# Patient Record
Sex: FEMALE | Race: WHITE | URBAN - METROPOLITAN AREA
[De-identification: names, ages, dates, MRNs, and addresses within clinical notes are randomized per-mention and may not be internally consistent; named-entity substitution may affect disease eponyms.]

---

## 2022-03-25 ENCOUNTER — OFFICE VISIT (OUTPATIENT)
Dept: URBAN - METROPOLITAN AREA CLINIC 16 | Facility: CLINIC | Age: 72
End: 2022-03-25
Payer: MEDICARE

## 2022-03-25 DIAGNOSIS — H25.813 COMBINED FORMS OF AGE-RELATED CATARACT, BILATERAL: Primary | ICD-10-CM

## 2022-03-25 PROCEDURE — 99204 OFFICE O/P NEW MOD 45 MIN: CPT | Performed by: OPHTHALMOLOGY

## 2022-03-25 ASSESSMENT — VISUAL ACUITY
OD: 20/50
OS: 20/50

## 2022-03-25 ASSESSMENT — KERATOMETRY
OD: 42.38
OS: 43.00

## 2022-03-25 ASSESSMENT — INTRAOCULAR PRESSURE
OS: 17
OD: 17

## 2022-03-25 NOTE — IMPRESSION/PLAN
Impression: Combined forms of age-related cataract, bilateral: H25.813. .  Visually significant, quality of life issue, could improve with surgery. Plan: Cataracts account for the patient's complaints. Discussed all risks, benefits, alternatives, procedures and recovery. Patient understands changing glasses will not improve vision. Patient desires to have surgery, recommend phacoemulsification with intraocular lens implant OS. lvl 2 - pt considering Vivity IOL - AIM plano OD and -0.35OS. Re-evaluate OD for cataract surgery after OS is done. Not OK for Dexcyu.

## 2022-07-21 ENCOUNTER — OFFICE VISIT (OUTPATIENT)
Dept: URBAN - NONMETROPOLITAN AREA CLINIC 13 | Facility: CLINIC | Age: 72
End: 2022-07-21
Payer: MEDICARE

## 2022-07-21 DIAGNOSIS — H25.811 COMBINED FORMS OF AGE-RELATED CATARACT, RIGHT EYE: ICD-10-CM

## 2022-07-21 DIAGNOSIS — H25.813 COMBINED FORMS OF AGE-RELATED CATARACT, BILATERAL: Primary | ICD-10-CM

## 2022-07-21 PROCEDURE — 99214 OFFICE O/P EST MOD 30 MIN: CPT | Performed by: OPHTHALMOLOGY

## 2022-07-21 ASSESSMENT — VISUAL ACUITY
OS: 20/40
OD: 20/50

## 2022-07-21 ASSESSMENT — INTRAOCULAR PRESSURE
OS: 19
OD: 19

## 2022-07-21 NOTE — IMPRESSION/PLAN
Impression: Combined forms of age-related cataract, bilateral: H25.813. .  Visually significant, quality of life issue, could improve with surgery. Plan: Cataracts account for the patient's complaints. Discussed all risks, benefits, alternatives, procedures and recovery. Patient understands changing glasses will not improve vision. Patient desires to have surgery, recommend phacoemulsification with intraocular lens implant OS. lvl 2 - pt considering Vivity IOL - AIM plano OD and -0.35 OS. OS then OD. OK for Dexcyu.

## 2022-07-25 ENCOUNTER — TESTING ONLY (OUTPATIENT)
Dept: URBAN - NONMETROPOLITAN AREA CLINIC 13 | Facility: CLINIC | Age: 72
End: 2022-07-25
Payer: MEDICARE

## 2022-07-25 DIAGNOSIS — Z01.818 ENCOUNTER FOR OTHER PREPROCEDURAL EXAMINATION: Primary | ICD-10-CM

## 2022-07-25 DIAGNOSIS — H25.813 COMBINED FORMS OF AGE-RELATED CATARACT, BILATERAL: ICD-10-CM

## 2022-07-25 PROCEDURE — 99203 OFFICE O/P NEW LOW 30 MIN: CPT | Performed by: PHYSICIAN ASSISTANT

## 2022-08-09 ENCOUNTER — SURGERY (OUTPATIENT)
Dept: URBAN - NONMETROPOLITAN AREA SURGERY 4 | Facility: SURGERY | Age: 72
End: 2022-08-09
Payer: MEDICARE

## 2022-08-09 DIAGNOSIS — H25.813 COMBINED FORMS OF AGE-RELATED CATARACT, BILATERAL: Primary | ICD-10-CM

## 2022-08-09 PROCEDURE — 66984 XCAPSL CTRC RMVL W/O ECP: CPT | Performed by: OPHTHALMOLOGY

## 2022-08-09 PROCEDURE — PR1FY PR1FY: CUSTOM | Performed by: OPHTHALMOLOGY

## 2022-08-10 ENCOUNTER — POST-OPERATIVE VISIT (OUTPATIENT)
Dept: URBAN - NONMETROPOLITAN AREA CLINIC 13 | Facility: CLINIC | Age: 72
End: 2022-08-10
Payer: MEDICARE

## 2022-08-10 DIAGNOSIS — Z48.810 ENCOUNTER FOR SURGICAL AFTERCARE FOLLOWING SURGERY ON A SENSE ORGAN: Primary | ICD-10-CM

## 2022-08-10 PROCEDURE — 99024 POSTOP FOLLOW-UP VISIT: CPT | Performed by: OPTOMETRIST

## 2022-08-10 ASSESSMENT — INTRAOCULAR PRESSURE
OS: 17
OD: 16

## 2022-08-10 NOTE — IMPRESSION/PLAN
Impression: S/P Cataract Extraction by phacoemulsification with IOL placement OS - 1 Day. Encounter for surgical aftercare following surgery on a sense organ  Z48.810. Post operative instructions reviewed - Plan: no dexy, needs to start post op gtts qid in OS also tears daily, pt ed on s/s of infection/rd/high iop need to rtc asap. monitor x 1 week.

## 2022-08-17 ENCOUNTER — POST-OPERATIVE VISIT (OUTPATIENT)
Dept: URBAN - NONMETROPOLITAN AREA CLINIC 13 | Facility: CLINIC | Age: 72
End: 2022-08-17
Payer: MEDICARE

## 2022-08-17 PROCEDURE — 99024 POSTOP FOLLOW-UP VISIT: CPT | Performed by: OPTOMETRIST

## 2022-08-17 RX ORDER — PREDNISOLONE ACETATE 10 MG/ML
1 % SUSPENSION/ DROPS OPHTHALMIC
Qty: 5 | Refills: 0 | Status: ACTIVE
Start: 2022-08-17

## 2022-08-17 ASSESSMENT — VISUAL ACUITY
OS: 20/100
OD: 20/25

## 2022-08-17 ASSESSMENT — INTRAOCULAR PRESSURE
OS: 9
OD: 10

## 2022-08-17 NOTE — IMPRESSION/PLAN
Impression:  Encounter for surgical aftercare following surgery on a sense organ  Z48.810. Plan: ? dexycu, pt did not get drops so needs to startt Pred Acetate Q2H  OS for 2 days then return for exam. pt ed on s/s of worsening issue need to rtc asap , tears daily, pt ed on s/s of infection/rd/high iop need to rtc asap. monitor x 2 days as directed, does not look infected, just inflammed and has had ? if any inflammatory treatment so will add this on.

## 2022-08-19 ENCOUNTER — POST-OPERATIVE VISIT (OUTPATIENT)
Dept: URBAN - NONMETROPOLITAN AREA CLINIC 13 | Facility: CLINIC | Age: 72
End: 2022-08-19
Payer: MEDICARE

## 2022-08-19 DIAGNOSIS — Z48.810 ENCOUNTER FOR SURGICAL AFTERCARE FOLLOWING SURGERY ON A SENSE ORGAN: Primary | ICD-10-CM

## 2022-08-19 PROCEDURE — 99024 POSTOP FOLLOW-UP VISIT: CPT | Performed by: OPTOMETRIST

## 2022-08-19 ASSESSMENT — VISUAL ACUITY
OD: 20/25
OS: 20/150

## 2022-08-19 ASSESSMENT — INTRAOCULAR PRESSURE
OS: 15
OD: 13

## 2022-08-19 NOTE — IMPRESSION/PLAN
Impression: S/P Cataract Extraction by phacoemulsification with IOL placement OS - 10 Days. Encounter for surgical aftercare following surgery on a sense organ  Z48.810. Condition is improving - Plan: Continue Pred QID OS, tears daily, pt ed on s/s of infection/rd/high iop need to rtc asap. monitor x 1 week.

## 2022-08-26 ENCOUNTER — POST-OPERATIVE VISIT (OUTPATIENT)
Dept: URBAN - NONMETROPOLITAN AREA CLINIC 13 | Facility: CLINIC | Age: 72
End: 2022-08-26
Payer: MEDICARE

## 2022-08-26 DIAGNOSIS — Z48.810 ENCOUNTER FOR SURGICAL AFTERCARE FOLLOWING SURGERY ON A SENSE ORGAN: Primary | ICD-10-CM

## 2022-08-26 PROCEDURE — 99024 POSTOP FOLLOW-UP VISIT: CPT | Performed by: OPTOMETRIST

## 2022-08-26 RX ORDER — PREDNISOLONE ACETATE 10 MG/ML
1 % SUSPENSION/ DROPS OPHTHALMIC
Qty: 5 | Refills: 1 | Status: ACTIVE
Start: 2022-08-26

## 2022-08-26 ASSESSMENT — INTRAOCULAR PRESSURE
OD: 16
OS: 16

## 2022-08-26 ASSESSMENT — VISUAL ACUITY: OS: 20/80

## 2022-08-26 NOTE — IMPRESSION/PLAN
Impression:  Encounter for surgical aftercare following surgery on a sense organ  Z48.810. Post operative instructions reviewed - Condition is improving - Plan: pred use tid and continue w/ tears daily, pt ed on s/s of infection/rd/high iop need to rtc asap. monitor x 2-3 weeks, pt ed on use of drops and things are improving, still decreased va but improving so will monitor when returns and decide if do surgery OD.

## 2022-09-16 ENCOUNTER — POST-OPERATIVE VISIT (OUTPATIENT)
Dept: URBAN - NONMETROPOLITAN AREA CLINIC 13 | Facility: CLINIC | Age: 72
End: 2022-09-16
Payer: MEDICARE

## 2022-09-16 DIAGNOSIS — Z48.810 ENCOUNTER FOR SURGICAL AFTERCARE FOLLOWING SURGERY ON A SENSE ORGAN: Primary | ICD-10-CM

## 2022-09-16 PROCEDURE — 99024 POSTOP FOLLOW-UP VISIT: CPT | Performed by: OPTOMETRIST

## 2022-09-16 RX ORDER — DICLOFENAC SODIUM 1 MG/ML
0.1 % SOLUTION/ DROPS OPHTHALMIC
Qty: 5 | Refills: 0 | Status: ACTIVE
Start: 2022-09-16

## 2022-09-16 ASSESSMENT — VISUAL ACUITY
OS: 20/80
OD: 20/25

## 2022-09-16 ASSESSMENT — INTRAOCULAR PRESSURE
OS: 12
OD: 14

## 2022-09-16 NOTE — IMPRESSION/PLAN
Impression: S/P Cataract Extraction by phacoemulsification with IOL placement OS - 38 Days. Encounter for surgical aftercare following surgery on a sense organ  Z48.810. Post operative instructions reviewed - Plan: Monitor, pt on pred now has moderate amount of dme, reason for decrease vision, continue w/ pred tid and added on diclofenac tid as well, will have pt see retina for eval and possible tx 2' to amount of cme, pt ed on s/s of worsening issue rtc asap, will postpone os surgery for now.

## 2022-09-28 ENCOUNTER — OFFICE VISIT (OUTPATIENT)
Dept: URBAN - NONMETROPOLITAN AREA CLINIC 13 | Facility: CLINIC | Age: 72
End: 2022-09-28
Payer: MEDICARE

## 2022-09-28 DIAGNOSIS — H25.813 COMBINED FORMS OF AGE-RELATED CATARACT, BILATERAL: Primary | ICD-10-CM

## 2022-09-28 DIAGNOSIS — H59.032 CYSTOID MACULAR EDEMA FOLLOWING CATARACT SURGERY, LEFT EYE: ICD-10-CM

## 2022-09-28 PROCEDURE — 92014 COMPRE OPH EXAM EST PT 1/>: CPT | Performed by: OPHTHALMOLOGY

## 2022-09-28 PROCEDURE — 92134 CPTRZ OPH DX IMG PST SGM RTA: CPT | Performed by: OPHTHALMOLOGY

## 2022-09-28 ASSESSMENT — INTRAOCULAR PRESSURE
OD: 16
OS: 28

## 2022-09-28 ASSESSMENT — VISUAL ACUITY
OD: 20/25
OS: 20/60

## 2022-09-28 NOTE — IMPRESSION/PLAN
Impression: Cystoid macular edema following cataract surgery, left eye: H59.032. Plan: The exam and oct show that the patients CME is improving OS. The patient will continue her drops and return to clinic with Dr. Sharon La in one month. The patient will return to retina PRN.

## 2022-10-31 ENCOUNTER — POST-OPERATIVE VISIT (OUTPATIENT)
Dept: URBAN - NONMETROPOLITAN AREA CLINIC 13 | Facility: CLINIC | Age: 72
End: 2022-10-31
Payer: MEDICARE

## 2022-10-31 DIAGNOSIS — Z96.1 PRESENCE OF INTRAOCULAR LENS: Primary | ICD-10-CM

## 2022-10-31 PROCEDURE — 99024 POSTOP FOLLOW-UP VISIT: CPT | Performed by: OPTOMETRIST

## 2022-10-31 ASSESSMENT — VISUAL ACUITY: OS: 20/50

## 2022-10-31 ASSESSMENT — INTRAOCULAR PRESSURE: OS: 17

## 2022-10-31 NOTE — IMPRESSION/PLAN
Impression: S/P Cataract Extraction by phacoemulsification with IOL placement OS - 83 Days. Presence of intraocular lens  Z96.1.  Post operative instructions reviewed - Condition is improving - Plan: monitor, continue w/ gtts as directed, cme resolving, monitor x 3 weeks w/ recheck and same day oct mac OU

## 2022-11-23 ENCOUNTER — POST-OPERATIVE VISIT (OUTPATIENT)
Dept: URBAN - NONMETROPOLITAN AREA CLINIC 13 | Facility: CLINIC | Age: 72
End: 2022-11-23
Payer: MEDICARE

## 2022-11-23 DIAGNOSIS — Z48.810 ENCOUNTER FOR SURGICAL AFTERCARE FOLLOWING SURGERY ON A SENSE ORGAN: Primary | ICD-10-CM

## 2022-11-23 PROCEDURE — 99024 POSTOP FOLLOW-UP VISIT: CPT | Performed by: OPTOMETRIST

## 2022-11-23 RX ORDER — PREDNISOLONE ACETATE 10 MG/ML
1 % SUSPENSION/ DROPS OPHTHALMIC
Qty: 5 | Refills: 1 | Status: ACTIVE
Start: 2022-11-23

## 2022-11-23 ASSESSMENT — INTRAOCULAR PRESSURE: OS: 16

## 2022-11-23 NOTE — IMPRESSION/PLAN
Impression: S/P Cataract Extraction by phacoemulsification with IOL placement OS - 106 Days. Encounter for surgical aftercare following surgery on a sense organ  Z48.810. Post operative instructions reviewed - Condition is improving - Plan: continue w/ pred bid OS then taper to qam, tears daily, pt ed on s/s of infection/rd/high iop need to rtc asap. monitor x 3 week. discussed w/ retina, if not improving can consider doing intravit kenalog but will monitor for now. pt ed on s/s of worsening issue rtc asap.

## 2022-12-21 ENCOUNTER — POST-OPERATIVE VISIT (OUTPATIENT)
Dept: URBAN - NONMETROPOLITAN AREA CLINIC 13 | Facility: CLINIC | Age: 72
End: 2022-12-21
Payer: MEDICARE

## 2022-12-21 DIAGNOSIS — Z48.810 ENCOUNTER FOR SURGICAL AFTERCARE FOLLOWING SURGERY ON A SENSE ORGAN: Primary | ICD-10-CM

## 2022-12-21 PROCEDURE — 99024 POSTOP FOLLOW-UP VISIT: CPT | Performed by: OPTOMETRIST

## 2022-12-21 ASSESSMENT — VISUAL ACUITY
OS: 20/40
OD: 20/25

## 2022-12-21 ASSESSMENT — INTRAOCULAR PRESSURE: OS: 17

## 2022-12-21 NOTE — IMPRESSION/PLAN
Impression:  Encounter for surgical aftercare following surgery on a sense organ  Z48.810. Post operative instructions reviewed - Condition is improving - Plan: monitor pt is s/p kpe IOL w/ chronic cme OS but resolving, still has prominent mild vmt seems to be reason for decrease VA OS as plano refraction on exam w/ 20/40 BCVA, pt will consult w/ retina too see if any other procedure rec'd or just monitor. pt ed on s/s of infection/rd/high iop need to rtc asap.

## 2023-01-11 ENCOUNTER — OFFICE VISIT (OUTPATIENT)
Dept: URBAN - NONMETROPOLITAN AREA CLINIC 13 | Facility: CLINIC | Age: 73
End: 2023-01-11
Payer: MEDICARE

## 2023-01-11 DIAGNOSIS — Z96.1 PRESENCE OF INTRAOCULAR LENS: ICD-10-CM

## 2023-01-11 DIAGNOSIS — H59.032 CYSTOID MACULAR EDEMA FOLLOWING CATARACT SURGERY, LEFT EYE: Primary | ICD-10-CM

## 2023-01-11 PROCEDURE — 92134 CPTRZ OPH DX IMG PST SGM RTA: CPT | Performed by: OPHTHALMOLOGY

## 2023-01-11 PROCEDURE — 99214 OFFICE O/P EST MOD 30 MIN: CPT | Performed by: OPHTHALMOLOGY

## 2023-01-11 ASSESSMENT — INTRAOCULAR PRESSURE
OD: 21
OS: 19

## 2023-01-11 NOTE — IMPRESSION/PLAN
Impression: Cystoid macular edema following cataract surgery, left eye: H59.032. Plan: Mild and parafovea, seemingly improving. Will HOLD sub-Tenon's Kenalog injection for now. RTC 4 weeks to re-eval. Taper off steroid gtt in two weeks. Vitreomacular adhesion w/out traction noted on OCT.

## 2023-03-08 ENCOUNTER — OFFICE VISIT (OUTPATIENT)
Dept: URBAN - NONMETROPOLITAN AREA CLINIC 13 | Facility: CLINIC | Age: 73
End: 2023-03-08
Payer: MEDICARE

## 2023-03-08 DIAGNOSIS — H43.822 VITREOMACULAR ADHESION, LEFT EYE: ICD-10-CM

## 2023-03-08 DIAGNOSIS — H59.032 CYSTOID MACULAR EDEMA FOLLOWING CATARACT SURGERY, LEFT EYE: Primary | ICD-10-CM

## 2023-03-08 DIAGNOSIS — H25.11 AGE-RELATED NUCLEAR CATARACT, RIGHT EYE: ICD-10-CM

## 2023-03-08 PROCEDURE — 92134 CPTRZ OPH DX IMG PST SGM RTA: CPT | Performed by: OPHTHALMOLOGY

## 2023-03-08 PROCEDURE — 99214 OFFICE O/P EST MOD 30 MIN: CPT | Performed by: OPHTHALMOLOGY

## 2023-03-08 ASSESSMENT — INTRAOCULAR PRESSURE
OS: 15
OD: 18

## 2023-03-08 NOTE — IMPRESSION/PLAN
Impression: Cystoid macular edema following cataract surgery, left eye: H59.032. Plan: Resolved, doing great. Vitreomacular adhesion w/out traction noted on OCT.

## 2023-03-08 NOTE — IMPRESSION/PLAN
Impression: Vitreomacular adhesion, left eye: H43.822. Plan: Stable, observe. Consider PPV should Va decline and/or traction increase. RTC 4 months.

## 2023-03-31 ENCOUNTER — OFFICE VISIT (OUTPATIENT)
Dept: URBAN - METROPOLITAN AREA CLINIC 16 | Facility: CLINIC | Age: 73
End: 2023-03-31
Payer: MEDICARE

## 2023-03-31 DIAGNOSIS — H25.811 COMBINED FORMS OF AGE-RELATED CATARACT, RIGHT EYE: Primary | ICD-10-CM

## 2023-03-31 DIAGNOSIS — H26.492 OTHER SECONDARY CATARACT, LEFT EYE: ICD-10-CM

## 2023-03-31 PROCEDURE — 99214 OFFICE O/P EST MOD 30 MIN: CPT | Performed by: OPHTHALMOLOGY

## 2023-03-31 RX ORDER — PREDNISOLONE ACETATE 10 MG/ML
1 % SUSPENSION/ DROPS OPHTHALMIC
Qty: 5 | Refills: 1 | Status: ACTIVE
Start: 2023-03-31

## 2023-03-31 RX ORDER — DICLOFENAC SODIUM 1 MG/ML
0.1 % SOLUTION/ DROPS OPHTHALMIC
Qty: 5 | Refills: 2 | Status: ACTIVE
Start: 2023-03-31

## 2023-03-31 ASSESSMENT — INTRAOCULAR PRESSURE
OS: 18
OD: 20

## 2023-03-31 ASSESSMENT — VISUAL ACUITY
OD: 20/30
OS: 20/30

## 2023-03-31 ASSESSMENT — KERATOMETRY: OS: 42.38

## 2023-03-31 NOTE — IMPRESSION/PLAN
Impression: Other secondary cataract, left eye: H26.492. .  Visually significant, quality of life issue, could improve with surgery. Plan: Discussed all risks, benefits, alternatives, procedures and recovery. Patient understands changing glasses will not improve vision.   Patient desires to have surgery, recommend yag capsulotomy OS. pretreat with PF and acular 3 d prior

## 2023-03-31 NOTE — IMPRESSION/PLAN
Impression: Combined forms of age-related cataract, right eye: H25.811. .  Visually significant, quality of life issue, could improve with surgery. Plan: Cataracts account for the patient's complaints. Discussed all risks, benefits, alternatives, procedures and recovery. Patient understands changing glasses will not improve vision. Patient desires to have surgery, recommend phacoemulsification with intraocular lens implant OD.  lvl 2 - pt wants vivity IOL - AIM plano. pretreat with drops.

## 2023-05-08 ENCOUNTER — ADULT PHYSICAL (OUTPATIENT)
Dept: URBAN - NONMETROPOLITAN AREA CLINIC 13 | Facility: CLINIC | Age: 73
End: 2023-05-08
Payer: MEDICARE

## 2023-05-08 DIAGNOSIS — H25.811 COMBINED FORMS OF AGE-RELATED CATARACT, RIGHT EYE: ICD-10-CM

## 2023-05-08 DIAGNOSIS — Z01.818 ENCOUNTER FOR OTHER PREPROCEDURAL EXAMINATION: Primary | ICD-10-CM

## 2023-05-08 PROCEDURE — 99213 OFFICE O/P EST LOW 20 MIN: CPT | Performed by: PHYSICIAN ASSISTANT

## 2023-05-16 ENCOUNTER — SURGERY (OUTPATIENT)
Dept: URBAN - NONMETROPOLITAN AREA SURGERY 4 | Facility: SURGERY | Age: 73
End: 2023-05-16
Payer: MEDICARE

## 2023-05-16 DIAGNOSIS — H25.811 COMBINED FORMS OF AGE-RELATED CATARACT, RIGHT EYE: Primary | ICD-10-CM

## 2023-05-16 PROCEDURE — 66984 XCAPSL CTRC RMVL W/O ECP: CPT | Performed by: OPHTHALMOLOGY

## 2023-05-16 PROCEDURE — PR1FY PR1FY: CUSTOM | Performed by: OPHTHALMOLOGY

## 2023-05-17 ENCOUNTER — POST-OPERATIVE VISIT (OUTPATIENT)
Dept: URBAN - NONMETROPOLITAN AREA CLINIC 14 | Facility: CLINIC | Age: 73
End: 2023-05-17
Payer: MEDICARE

## 2023-05-17 DIAGNOSIS — Z48.810 ENCOUNTER FOR SURGICAL AFTERCARE FOLLOWING SURGERY ON A SENSE ORGAN: Primary | ICD-10-CM

## 2023-05-17 PROCEDURE — 99024 POSTOP FOLLOW-UP VISIT: CPT | Performed by: OPTOMETRIST

## 2023-05-17 RX ORDER — PREDNISOLONE ACETATE 10 MG/ML
1 % SUSPENSION/ DROPS OPHTHALMIC
Qty: 5 | Refills: 1 | Status: ACTIVE
Start: 2023-05-17

## 2023-05-17 RX ORDER — DICLOFENAC SODIUM 1 MG/ML
0.1 % SOLUTION/ DROPS OPHTHALMIC
Qty: 5 | Refills: 2 | Status: ACTIVE
Start: 2023-05-17

## 2023-05-17 RX ORDER — OFLOXACIN 3 MG/ML
0.3 % SOLUTION/ DROPS OPHTHALMIC
Qty: 5 | Refills: 0 | Status: ACTIVE
Start: 2023-05-17

## 2023-05-17 ASSESSMENT — INTRAOCULAR PRESSURE
OS: 10
OD: 12

## 2023-05-24 ENCOUNTER — POST-OPERATIVE VISIT (OUTPATIENT)
Dept: URBAN - NONMETROPOLITAN AREA CLINIC 13 | Facility: CLINIC | Age: 73
End: 2023-05-24
Payer: MEDICARE

## 2023-05-24 DIAGNOSIS — Z96.1 PRESENCE OF INTRAOCULAR LENS: Primary | ICD-10-CM

## 2023-05-24 ASSESSMENT — VISUAL ACUITY
OS: 20/40
OD: 20/40

## 2023-05-24 ASSESSMENT — INTRAOCULAR PRESSURE
OS: 11
OD: 13

## 2023-05-24 NOTE — IMPRESSION/PLAN
Impression: S/P Cataract Extraction by phacoemulsification with IOL placement OD - 8 Days. Presence of intraocular lens  Z96.1. Plan: taper pred, diclofenac and d/c ocuflox tears daily, pt ed on s/s of infection/rd/high iop need to rtc asap. monitor x 3-4 week.  oct mac at next exam OU

## 2023-06-19 ENCOUNTER — POST-OPERATIVE VISIT (OUTPATIENT)
Dept: URBAN - NONMETROPOLITAN AREA CLINIC 13 | Facility: CLINIC | Age: 73
End: 2023-06-19
Payer: MEDICARE

## 2023-06-19 DIAGNOSIS — Z96.1 PRESENCE OF INTRAOCULAR LENS: ICD-10-CM

## 2023-06-19 DIAGNOSIS — H59.031 CYSTOID MACULAR EDEMA FOLLOWING CATARACT SURGERY, RIGHT EYE: Primary | ICD-10-CM

## 2023-06-19 DIAGNOSIS — H25.11 AGE-RELATED NUCLEAR CATARACT, RIGHT EYE: ICD-10-CM

## 2023-06-19 PROCEDURE — 92134 CPTRZ OPH DX IMG PST SGM RTA: CPT | Performed by: OPTOMETRIST

## 2023-06-19 ASSESSMENT — INTRAOCULAR PRESSURE
OS: 12
OD: 13

## 2023-06-19 ASSESSMENT — VISUAL ACUITY
OS: 20/40
OD: 20/50

## 2023-06-19 NOTE — IMPRESSION/PLAN
Impression: Cystoid macular edema following cataract surgery, right eye: H59.031. Plan: pt now has cme OD, needs to restart gtts as directed. and monitor closely.

## 2023-06-19 NOTE — IMPRESSION/PLAN
Impression:  Presence of intraocular lens  Z96.1.  Plan: pt has completed gtts but now has cme OD, continue w/ , tears daily, restart pred and diclofenac tid OD,  pt ed on s/s of infection/rd/high iop need to rtc asap. monitor x 2 week w/ exam and repeat oct mac OU

## 2023-07-03 ENCOUNTER — POST-OPERATIVE VISIT (OUTPATIENT)
Dept: URBAN - NONMETROPOLITAN AREA CLINIC 13 | Facility: CLINIC | Age: 73
End: 2023-07-03
Payer: MEDICARE

## 2023-07-03 DIAGNOSIS — Z48.810 ENCOUNTER FOR SURGICAL AFTERCARE FOLLOWING SURGERY ON THE SENSE ORGANS: ICD-10-CM

## 2023-07-03 DIAGNOSIS — H59.031 CYSTOID MACULAR EDEMA FOLLOWING CATARACT SURGERY, RIGHT EYE: ICD-10-CM

## 2023-07-03 DIAGNOSIS — Z96.1 PRESENCE OF INTRAOCULAR LENS: Primary | ICD-10-CM

## 2023-07-03 DIAGNOSIS — H25.811 COMBINED FORMS OF AGE-RELATED CATARACT, RIGHT EYE: ICD-10-CM

## 2023-07-03 PROCEDURE — 92134 CPTRZ OPH DX IMG PST SGM RTA: CPT | Performed by: OPTOMETRIST

## 2023-07-03 ASSESSMENT — INTRAOCULAR PRESSURE
OS: 13
OD: 13

## 2023-07-03 ASSESSMENT — VISUAL ACUITY: OD: 20/50

## 2023-07-03 NOTE — IMPRESSION/PLAN
Impression:  Presence of intraocular lens  Z96.1. Plan: cme improving, continue w/ meds pred/diclofenac tid OD, , tears daily, pt ed on s/s of infection/rd/high iop need to rtc asap. monitor x 2 week for oct mac and same day iop check.

## 2023-07-19 ENCOUNTER — POST-OPERATIVE VISIT (OUTPATIENT)
Dept: URBAN - NONMETROPOLITAN AREA CLINIC 13 | Facility: CLINIC | Age: 73
End: 2023-07-19
Payer: MEDICARE

## 2023-07-19 DIAGNOSIS — H59.031 CYSTOID MACULAR EDEMA FOLLOWING CATARACT SURGERY, RIGHT EYE: Primary | ICD-10-CM

## 2023-07-19 DIAGNOSIS — Z96.1 PRESENCE OF INTRAOCULAR LENS: ICD-10-CM

## 2023-07-19 ASSESSMENT — INTRAOCULAR PRESSURE
OS: 10
OD: 11

## 2023-07-19 ASSESSMENT — VISUAL ACUITY
OS: 20/30
OD: 20/50

## 2023-07-19 NOTE — IMPRESSION/PLAN
Impression: S/P Cataract Extraction by phacoemulsification with IOL placement OD - 64 Days. Presence of intraocular lens  Z96.1. Plan: Monitor, continue w/ present mgnt.

## 2023-07-19 NOTE — IMPRESSION/PLAN
Impression: Cystoid macular edema following cataract surgery, right eye: H59.031. Plan: pt has chronic cme following cataract surgery, been on pred/diclofenac for 1 month but still persistent.  discussed case w/ dr kennedy needs xipere, needs auth. pt notes has some fear of needle, wants to discuss w/ surgeon, oct mac needs repeated at next exam.

## 2023-07-26 ENCOUNTER — OFFICE VISIT (OUTPATIENT)
Dept: URBAN - NONMETROPOLITAN AREA CLINIC 13 | Facility: CLINIC | Age: 73
End: 2023-07-26
Payer: MEDICARE

## 2023-07-26 DIAGNOSIS — H59.031 CYSTOID MACULAR EDEMA FOLLOWING CATARACT SURGERY, RIGHT EYE: Primary | ICD-10-CM

## 2023-07-26 PROCEDURE — 92134 CPTRZ OPH DX IMG PST SGM RTA: CPT | Performed by: OPHTHALMOLOGY

## 2023-07-26 PROCEDURE — 99024 POSTOP FOLLOW-UP VISIT: CPT | Performed by: OPHTHALMOLOGY

## 2023-07-26 ASSESSMENT — INTRAOCULAR PRESSURE
OD: 18
OS: 17

## 2023-08-09 ENCOUNTER — PROCEDURE (OUTPATIENT)
Dept: URBAN - NONMETROPOLITAN AREA CLINIC 13 | Facility: CLINIC | Age: 73
End: 2023-08-09
Payer: MEDICARE

## 2023-08-09 DIAGNOSIS — H59.031 CYSTOID MACULAR EDEMA FOLLOWING CATARACT SURGERY, RIGHT EYE: Primary | ICD-10-CM

## 2023-08-09 PROCEDURE — 0465T SUPRACHOROIDAL INJECTION OF PHARMACOLOGICAL AGENT: CPT | Performed by: OPHTHALMOLOGY

## 2023-08-09 PROCEDURE — 92250 FUNDUS PHOTOGRAPHY W/I&R: CPT | Performed by: OPHTHALMOLOGY

## 2023-08-09 PROCEDURE — 92134 CPTRZ OPH DX IMG PST SGM RTA: CPT | Performed by: OPHTHALMOLOGY

## 2023-08-09 ASSESSMENT — INTRAOCULAR PRESSURE
OS: 16
OD: 17

## 2023-09-20 ENCOUNTER — OFFICE VISIT (OUTPATIENT)
Dept: URBAN - NONMETROPOLITAN AREA CLINIC 13 | Facility: CLINIC | Age: 73
End: 2023-09-20
Payer: MEDICARE

## 2023-09-20 DIAGNOSIS — H43.822 VITREOMACULAR ADHESION, LEFT EYE: ICD-10-CM

## 2023-09-20 DIAGNOSIS — H59.031 CYSTOID MACULAR EDEMA FOLLOWING CATARACT SURGERY, RIGHT EYE: Primary | ICD-10-CM

## 2023-09-20 PROCEDURE — 92134 CPTRZ OPH DX IMG PST SGM RTA: CPT | Performed by: OPHTHALMOLOGY

## 2023-09-20 PROCEDURE — 99214 OFFICE O/P EST MOD 30 MIN: CPT | Performed by: OPHTHALMOLOGY

## 2023-09-20 PROCEDURE — 92250 FUNDUS PHOTOGRAPHY W/I&R: CPT | Performed by: OPHTHALMOLOGY

## 2023-09-20 ASSESSMENT — INTRAOCULAR PRESSURE
OD: 18
OS: 15

## 2024-01-10 ENCOUNTER — OFFICE VISIT (OUTPATIENT)
Dept: URBAN - NONMETROPOLITAN AREA CLINIC 13 | Facility: CLINIC | Age: 74
End: 2024-01-10
Payer: MEDICARE

## 2024-01-10 DIAGNOSIS — H43.822 VITREOMACULAR ADHESION, LEFT EYE: ICD-10-CM

## 2024-01-10 DIAGNOSIS — H59.031 CYSTOID MACULAR EDEMA FOLLOWING CATARACT SURGERY, RIGHT EYE: Primary | ICD-10-CM

## 2024-01-10 PROCEDURE — 99214 OFFICE O/P EST MOD 30 MIN: CPT | Performed by: OPHTHALMOLOGY

## 2024-01-10 PROCEDURE — 92134 CPTRZ OPH DX IMG PST SGM RTA: CPT | Performed by: OPHTHALMOLOGY

## 2024-01-10 ASSESSMENT — INTRAOCULAR PRESSURE
OD: 22
OS: 22

## 2024-04-10 ENCOUNTER — OFFICE VISIT (OUTPATIENT)
Dept: URBAN - NONMETROPOLITAN AREA CLINIC 13 | Facility: CLINIC | Age: 74
End: 2024-04-10
Payer: MEDICARE

## 2024-04-10 DIAGNOSIS — H43.822 VITREOMACULAR ADHESION, LEFT EYE: ICD-10-CM

## 2024-04-10 DIAGNOSIS — H59.031 CYSTOID MACULAR EDEMA FOLLOWING CATARACT SURGERY, RIGHT EYE: Primary | ICD-10-CM

## 2024-04-10 PROCEDURE — 99214 OFFICE O/P EST MOD 30 MIN: CPT | Performed by: OPHTHALMOLOGY

## 2024-04-10 PROCEDURE — 92134 CPTRZ OPH DX IMG PST SGM RTA: CPT | Performed by: OPHTHALMOLOGY

## 2024-04-10 ASSESSMENT — INTRAOCULAR PRESSURE
OD: 17
OS: 18

## 2024-08-07 ENCOUNTER — OFFICE VISIT (OUTPATIENT)
Dept: URBAN - NONMETROPOLITAN AREA CLINIC 13 | Facility: CLINIC | Age: 74
End: 2024-08-07
Payer: MEDICARE

## 2024-08-07 DIAGNOSIS — H59.031 CYSTOID MACULAR EDEMA FOLLOWING CATARACT SURGERY, RIGHT EYE: ICD-10-CM

## 2024-08-07 DIAGNOSIS — H43.822 VITREOMACULAR ADHESION, LEFT EYE: Primary | ICD-10-CM

## 2024-08-07 PROCEDURE — 92134 CPTRZ OPH DX IMG PST SGM RTA: CPT | Performed by: OPHTHALMOLOGY

## 2024-08-07 PROCEDURE — 99214 OFFICE O/P EST MOD 30 MIN: CPT | Performed by: OPHTHALMOLOGY

## 2024-08-07 ASSESSMENT — INTRAOCULAR PRESSURE
OS: 14
OD: 13

## 2024-10-02 ENCOUNTER — OFFICE VISIT (OUTPATIENT)
Dept: URBAN - NONMETROPOLITAN AREA CLINIC 13 | Facility: CLINIC | Age: 74
End: 2024-10-02
Payer: MEDICARE

## 2024-10-02 DIAGNOSIS — Z96.1 PRESENCE OF INTRAOCULAR LENS: Primary | ICD-10-CM

## 2024-10-02 DIAGNOSIS — H43.822 VITREOMACULAR ADHESION, LEFT EYE: ICD-10-CM

## 2024-10-02 DIAGNOSIS — H26.493 OTHER SECONDARY CATARACT, BILATERAL: ICD-10-CM

## 2024-10-02 PROCEDURE — 92014 COMPRE OPH EXAM EST PT 1/>: CPT | Performed by: OPTOMETRIST

## 2024-10-02 RX ORDER — PREDNISOLONE ACETATE 10 MG/ML
1 % SUSPENSION/ DROPS OPHTHALMIC
Qty: 5 | Refills: 1 | Status: INACTIVE
Start: 2024-10-02 | End: 2024-10-02

## 2024-10-02 RX ORDER — OFLOXACIN 3 MG/ML
0.3 % SOLUTION/ DROPS OPHTHALMIC
Qty: 5 | Refills: 0 | Status: INACTIVE
Start: 2024-10-02 | End: 2024-10-02

## 2024-10-02 ASSESSMENT — VISUAL ACUITY
OS: 20/30
OD: 20/30

## 2025-02-12 ENCOUNTER — OFFICE VISIT (OUTPATIENT)
Dept: URBAN - NONMETROPOLITAN AREA CLINIC 13 | Facility: CLINIC | Age: 75
End: 2025-02-12
Payer: MEDICARE

## 2025-02-12 DIAGNOSIS — H59.031 CYSTOID MACULAR EDEMA FOLLOWING CATARACT SURGERY, RIGHT EYE: ICD-10-CM

## 2025-02-12 DIAGNOSIS — H43.822 VITREOMACULAR ADHESION, LEFT EYE: Primary | ICD-10-CM

## 2025-02-12 PROCEDURE — 99214 OFFICE O/P EST MOD 30 MIN: CPT | Performed by: OPHTHALMOLOGY

## 2025-02-12 PROCEDURE — 92134 CPTRZ OPH DX IMG PST SGM RTA: CPT | Performed by: OPHTHALMOLOGY

## 2025-02-12 ASSESSMENT — INTRAOCULAR PRESSURE
OS: 18
OD: 18

## 2025-05-21 ENCOUNTER — OFFICE VISIT (OUTPATIENT)
Dept: URBAN - NONMETROPOLITAN AREA CLINIC 13 | Facility: CLINIC | Age: 75
End: 2025-05-21
Payer: MEDICARE

## 2025-05-21 DIAGNOSIS — H43.822 VITREOMACULAR ADHESION, LEFT EYE: ICD-10-CM

## 2025-05-21 DIAGNOSIS — H26.493 OTHER SECONDARY CATARACT, BILATERAL: Primary | ICD-10-CM

## 2025-05-21 DIAGNOSIS — Z96.1 PRESENCE OF INTRAOCULAR LENS: ICD-10-CM

## 2025-05-21 PROCEDURE — 92134 CPTRZ OPH DX IMG PST SGM RTA: CPT | Performed by: OPTOMETRIST

## 2025-05-21 PROCEDURE — 92014 COMPRE OPH EXAM EST PT 1/>: CPT | Performed by: OPTOMETRIST

## 2025-05-21 ASSESSMENT — VISUAL ACUITY
OS: 20/50
OD: 20/50

## 2025-08-13 ENCOUNTER — OFFICE VISIT (OUTPATIENT)
Dept: URBAN - NONMETROPOLITAN AREA CLINIC 13 | Facility: CLINIC | Age: 75
End: 2025-08-13
Payer: MEDICARE

## 2025-08-13 DIAGNOSIS — H59.031 CYSTOID MACULAR EDEMA FOLLOWING CATARACT SURGERY, RIGHT EYE: ICD-10-CM

## 2025-08-13 DIAGNOSIS — H43.822 VITREOMACULAR ADHESION, LEFT EYE: Primary | ICD-10-CM

## 2025-08-13 PROCEDURE — 92134 CPTRZ OPH DX IMG PST SGM RTA: CPT | Performed by: OPHTHALMOLOGY

## 2025-08-13 PROCEDURE — 99214 OFFICE O/P EST MOD 30 MIN: CPT | Performed by: OPHTHALMOLOGY

## 2025-08-13 ASSESSMENT — INTRAOCULAR PRESSURE
OD: 15
OS: 17